# Patient Record
Sex: FEMALE | Race: WHITE | Employment: OTHER | URBAN - METROPOLITAN AREA
[De-identification: names, ages, dates, MRNs, and addresses within clinical notes are randomized per-mention and may not be internally consistent; named-entity substitution may affect disease eponyms.]

---

## 2019-06-06 ENCOUNTER — OFFICE VISIT (OUTPATIENT)
Dept: ORTHOPEDIC SURGERY | Age: 74
End: 2019-06-06
Payer: MEDICARE

## 2019-06-06 VITALS
DIASTOLIC BLOOD PRESSURE: 58 MMHG | HEIGHT: 60 IN | HEART RATE: 45 BPM | WEIGHT: 173 LBS | BODY MASS INDEX: 33.96 KG/M2 | SYSTOLIC BLOOD PRESSURE: 108 MMHG

## 2019-06-06 DIAGNOSIS — M25.561 CHRONIC PAIN OF BOTH KNEES: Primary | ICD-10-CM

## 2019-06-06 DIAGNOSIS — M22.41 CHONDROMALACIA OF BOTH PATELLAE: ICD-10-CM

## 2019-06-06 DIAGNOSIS — M25.562 CHRONIC PAIN OF BOTH KNEES: Primary | ICD-10-CM

## 2019-06-06 DIAGNOSIS — G89.29 CHRONIC PAIN OF BOTH KNEES: Primary | ICD-10-CM

## 2019-06-06 DIAGNOSIS — M17.0 PRIMARY OSTEOARTHRITIS OF BOTH KNEES: ICD-10-CM

## 2019-06-06 DIAGNOSIS — M22.42 CHONDROMALACIA OF BOTH PATELLAE: ICD-10-CM

## 2019-06-06 PROCEDURE — L3170 FOOT PLAS HEEL STABI PRE OTS: HCPCS | Performed by: FAMILY MEDICINE

## 2019-06-06 PROCEDURE — 1090F PRES/ABSN URINE INCON ASSESS: CPT | Performed by: FAMILY MEDICINE

## 2019-06-06 PROCEDURE — G8427 DOCREV CUR MEDS BY ELIG CLIN: HCPCS | Performed by: FAMILY MEDICINE

## 2019-06-06 PROCEDURE — G8417 CALC BMI ABV UP PARAM F/U: HCPCS | Performed by: FAMILY MEDICINE

## 2019-06-06 PROCEDURE — 20610 DRAIN/INJ JOINT/BURSA W/O US: CPT | Performed by: FAMILY MEDICINE

## 2019-06-06 PROCEDURE — 99213 OFFICE O/P EST LOW 20 MIN: CPT | Performed by: FAMILY MEDICINE

## 2019-06-06 PROCEDURE — L1812 KO ELASTIC W/JOINTS PRE OTS: HCPCS | Performed by: FAMILY MEDICINE

## 2019-06-06 RX ORDER — BETAMETHASONE SODIUM PHOSPHATE AND BETAMETHASONE ACETATE 3; 3 MG/ML; MG/ML
24 INJECTION, SUSPENSION INTRA-ARTICULAR; INTRALESIONAL; INTRAMUSCULAR; SOFT TISSUE ONCE
Status: COMPLETED | OUTPATIENT
Start: 2019-06-06 | End: 2019-06-06

## 2019-06-06 RX ORDER — TRIAMTERENE AND HYDROCHLOROTHIAZIDE 75; 50 MG/1; MG/1
TABLET ORAL
COMMUNITY
Start: 2019-03-07

## 2019-06-06 RX ORDER — METOPROLOL SUCCINATE 50 MG/1
TABLET, EXTENDED RELEASE ORAL
COMMUNITY
Start: 2019-03-07

## 2019-06-06 RX ORDER — ATORVASTATIN CALCIUM 20 MG/1
TABLET, FILM COATED ORAL
COMMUNITY
Start: 2019-03-09

## 2019-06-06 RX ADMIN — BETAMETHASONE SODIUM PHOSPHATE AND BETAMETHASONE ACETATE 24 MG: 3; 3 INJECTION, SUSPENSION INTRA-ARTICULAR; INTRALESIONAL; INTRAMUSCULAR; SOFT TISSUE at 14:09

## 2019-06-06 NOTE — PROGRESS NOTES
Chief Complaint  Knee Pain (Bilateral knee. More stiffnessthan pain for the last 5 months)      Initial consultation of bilateral knee pain with difficulty walking distances climbing stairs with left knee pseudogout buckling and mild swelling    History of Present Illness:  Tanya Frost is a 69 y.o. female who is a very pleasant white female who still works full time 40 hours per week at the [employer] [place] as a  at [employer] who is a very nice patient of Dr. Javon Ricks who is being seen today in kind consultation from Dr Javon Ricks for evaluation of progressively worsening bilateral knee pain with left knee pseudo buckling. We have actually seen her about 10-12 years ago for underlying knee osteoarthritis and perform Visco supplementation which did help her substantially. She states that she has been having ongoing achy pain at times in her knees but more recently over the last 4-5 months since roughly January 2019 she has been having increasing achiness involving the knees as well as stiffness. She does not complain of a great deal of pain and denies rest or night pain. This did correspond to her picking up more hours at work and standing 40 hours per week. She did not have any actual history of fall or additional new activity. She has had pseudo-buckling on the left occasionally is having difficulty with positional changes distance walking going up and down stairs. She is unable squat and kneel does have motion deficits. She has been taking Tylenol episodically and apparently she's had some borderline elevations of creatinine the past and was counseled to avoid frequent anti-inflammatories. She does have some crepitation and popping. She has not had substantial locking or catching. She does not regularly utilize bracing nor has she had any recent therapy.   She does have a vacation planned to [place] with her sister September 2019 in which she'll be doing frequent walking. She is being seen today for orthopedic and sports consultation with updated imaging. Medical History     Patient's medications, allergies, past medical, surgical, social and family histories were reviewed and updated as appropriate. Review of Systems  Pertinent items are noted in HPI  Review of systems reviewed from Patient History Form dated on 6/6/2019 and available in the patient's chart under the Media tab. Vital Signs  Vitals:    06/06/19 1255   BP: (!) 108/58   Pulse: (!) 45       General Exam:     Constitutional: Patient is adequately groomed with no evidence of malnutrition  DTRs: Deep tendon reflexes are intact  Mental Status: The patient is oriented to time, place and person. The patient's mood and affect are appropriate. Lymphatic: The lymphatic examination bilaterally reveals all areas to be without enlargement or induration. Vascular: Examination reveals no swelling or calf tenderness. Peripheral pulses are palpable and 2+. Neurological: The patient has good coordination. There is no weakness or sensory deficit. Knee Examination  Inspection:  There is no high-grade deformities she does appear to be in slight varus. She does have patellofemoral crepitation and trace knee joint effusions. Palpation: This is a month or the medial and lateral patellofemoral facet and addition to her crepitation. She is mildly sore in the range of 2-3 out of 10 with palpation of the anterior third medial joint line. There is no substantial lateral tenderness. Rang of Motion:  She does lack about 10° of extension bilaterally with flexion to about 110. Fair quad tone. Hamstring somewhat tight. Strength:  4-5 with flexion and extension. Special Tests:  She does have some pain patellar grind testing and some pain with Rico's work now for crepitation. No high-grade clicks. Negative lateral Rico's. No evidence of instability.   Screening hip testing is reasonably benign. Skin: There are no rashes, ulcerations or lesions. Distal neurovascular exam is intact. Gait: Minimal altalgia. Reflex symmetrically preserved    Additional Comments:     Additional Examinations:  Right Lower Extremity: Examination of the right lower extremity does not show any tenderness, deformity or injury. Range of motion is unremarkable. There is no gross instability. There are no rashes, ulcerations or lesions. Strength and tone are normal.  Left Lower Extremity: Examination of the left lower extremity does not show any tenderness, deformity or injury. Range of motion is unremarkable. There is no gross instability. There are no rashes, ulcerations or lesions. Strength and tone are normal.  Examination of the bilateral hip reveals intact skin. The patient demonstrates full painless range of motion with regards to flexion, abduction, internal and external rotation. There is not tenderness about the greater trochanter. There is a negative straight leg raise against resistance. Strength is 5/5 thorough out all planes. Diagnostic Test Findings: Bilateral knee AP and PA weight-bearing sunrise and lateral films were obtained today and do show advanced medial compartment osteoarthritis with prominent patellofemoral arthropathy with spurring. Assessment :  #1. Symptomatic bilateral knee advanced osteoarthritis with prominent medial compartment and end-stage anterior compartment arthropathy with synovitis and left knee pseudo-buckling    Impression:  Encounter Diagnoses   Name Primary?     Chronic pain of both knees Yes    Primary osteoarthritis of both knees     Chondromalacia of both patellae        Office Procedures:  Orders Placed This Encounter   Procedures    XR KNEE LEFT (MIN 4 VIEWS)     4V L Knee     Standing Status:   Future     Number of Occurrences:   1     Standing Expiration Date:   7/6/2019     Order Specific Question:   Reason for exam:     Answer:   Knee Pain  XR KNEE RIGHT (MIN 4 VIEWS)     4V R Knee     Standing Status:   Future     Number of Occurrences:   1     Standing Expiration Date:   7/6/2019     Order Specific Question:   Reason for exam:     Answer:   Knee Pain    Ambulatory referral to Physical Therapy     Referral Priority:   Routine     Referral Type:   Eval and Treat     Referral Reason:   Specialty Services Required     Requested Specialty:   Physical Therapy     Number of Visits Requested:   1    MT ARTHROCENTESIS ASPIR&/INJ MAJOR JT/BURSA W/O US    EUFLEXXA INJ PER DOSE     BILATERAL KNEES     Standing Status:   Future     Standing Expiration Date:   6/5/2020    Visco K Heel Shoe Inserts     Patient was prescribed a Visco K Heel Shoe Insert. The bilateral FEET will require protection / support from this orthosis to improve their function. The orthosis will assist in protecting the affected area, provide functional support and facilitate healing. The patient was educated and fit by a healthcare professional with expert knowledge and specialization in brace application while under the direct supervision of the treating physician. Verbal and written instructions for the use of and application of this item were provided. They were instructed to contact the office immediately should the brace result in increased pain, decreased sensation, increased swelling or worsening of the condition.  DJO Hinged Patella Knee Stabilizer     Patient was prescribed a DJO Hinged Knee brace. The left knee will require stabilization / immobilization from this semi-rigid / rigid orthosis to improve their function. The orthosis will assist in protecting the affected area, provide functional support and facilitate healing. The patient was educated and fit by a healthcare professional with expert knowledge and specialization in brace application while under the direct supervision of the physician.   Verbal and written instructions for the use of and application of this item were provided. They were instructed to contact the office immediately should the brace result in increased pain, decreased sensation, increased swelling or worsening of the condition. Treatment Plan:  Treatment options were discussed with Woodford Councilman. We did review her plain films and exam findings. She does have quite pronounced medial compartment arthritis and end-stage patellofemoral arthropathy. We did opt to inject her knees bilaterally today using 2 mL of Celestone, 2 mL of Marcaine, 1 mL of Xylocaine. We did place her in a hinged patellar stabilizing brace for her more symptomatic left knee which does undergo pseudo-buckling at times. We will start her in a short course of physical therapy for VMO strengthening and hamstring flexibility. With her history of borderline creatinines in the past, we will treat her with Voltaren gel 4 g 4 times a day. She was placed in bilateral heel wedges. Indications for Visco supplementation was discussed. Once again she is going on vacation to [place] in September 2019 with her sister wants to be in good shape for this. Icing and activity modification was discussed. She will contact us to interim with concerns or concerns we'll see her back in a couple weeks. CC:  Dr Cony Claros      This dictation was performed with a verbal recognition program [facility]) and it was checked for errors. It is possible that there are still dictated errors within this office note. If so, please bring any errors to my attention for an addendum. All efforts were made to ensure that this office note is accurate.

## 2019-06-07 ENCOUNTER — TELEPHONE (OUTPATIENT)
Dept: ORTHOPEDIC SURGERY | Age: 74
End: 2019-06-07

## 2019-06-07 NOTE — TELEPHONE ENCOUNTER
06/07/2019 SYNVISC  (SERIES OF 3)   BILATERAL KNEES. NO AUTHORIZATION REQUIRED. CAN BUY& BILL. PER [place] @ [employer], CALL REF # [id number]. AP    NOTE: Original request was for non-preferred drug Euflexxa. Okay to switch to preferred drug Synvisc per Constantine Ramos.  AP

## 2019-06-12 ENCOUNTER — TELEPHONE (OUTPATIENT)
Dept: ORTHOPEDIC SURGERY | Age: 74
End: 2019-06-12

## 2019-06-12 NOTE — TELEPHONE ENCOUNTER
Spoke to patient and let them know that SYNVISC injections have been approved for their BILATERAL knees. Scheduled patient for 1st injection, will schedule next two in the office once she gets work schedule.

## 2019-06-13 ENCOUNTER — HOSPITAL ENCOUNTER (OUTPATIENT)
Dept: PHYSICAL THERAPY | Age: 74
Setting detail: THERAPIES SERIES
Discharge: HOME | End: 2019-06-13
Payer: MEDICARE

## 2019-06-13 PROCEDURE — 97530 THERAPEUTIC ACTIVITIES: CPT | Performed by: PHYSICAL THERAPIST

## 2019-06-13 PROCEDURE — 97161 PT EVAL LOW COMPLEX 20 MIN: CPT | Performed by: PHYSICAL THERAPIST

## 2019-06-13 PROCEDURE — 97110 THERAPEUTIC EXERCISES: CPT | Performed by: PHYSICAL THERAPIST

## 2019-06-13 NOTE — PLAN OF CARE
[facility] [address], [address] [place] [address], [address] [place] [address]  Phone: [phone]   Fax: [phone]                                                              Physical Therapy Certification    Dear Referring Practitioner: Zeenat Alonso MD,    We had the pleasure of evaluating the following patient for physical therapy services at [address] [place] [address]. A summary of our findings can be found in the initial assessment below. This includes our plan of care. If you have any questions or concerns regarding these findings, please do not hesitate to contact me at the office phone number checked above. Thank you for the referral.       Physician Signature:_______________________________Date:__________________  By signing above (or electronic signature), therapists plan is approved by physician      Patient: Bubba Rosales   : 1945   MRN: [id number]  Referring Physician: Referring Practitioner: Zeenat Alonso MD      Evaluation Date: 2019      Medical Diagnosis Information:  Diagnosis: M25.561, M25.562, G89.29 (ICD-10-CM) - Chronic pain of both knees; M17.0 (ICD-10-CM) - Primary osteoarthritis of both knees; M22.41, M22.42 (ICD-10-CM) - Chondromalacia of both patellae   Treatment Diagnosis: M25.561, M25.562, G89.29 (ICD-10-CM) - Chronic pain of both knees;; M62.81 muscle weakness                                           Precautions/ Contra-indications: none  Latex Allergy:  [x]NO      []YES  Preferred Language for Healthcare:   [x]English       []other:    SUBJECTIVE: Patient stated complaint: Pt presents for B knee pain. She has more stiffness than pain. She did get one shot Visco supplementation 10-12 years ago and is planning on getting this again. She was told she has a partially torn meniscus. Her pain has been worse again since January. She has achiness and stiffness.  She did  more hours at work and standing 40 hours per week. It is more stiff after standing at work all day. No injury. She does have a vacation planned to [place] with her sister September 2019 in which she'll be doing frequent walking. She does have quite pronounced medial compartment arthritis and end-stage patellofemoral arthropathy per MD note. Pt was given B knee injections at MD appt and this seemed to help. She was also given a hinged patellar stabilizing brace for L knee- wears this occasionally but it slides down. She was also given B lateral heel wedges- she is wearing those. Relevant Medical History:HTN  Functional Disability Index:   WOMAC- 27%  Relevant Meds:   Voltaren gel- using it here and there  Current pain:  0/10   Pain at worst:  2/10- front of knees  Pain at best:  0/10    Easing factors: cortisone injections  Provocative factors:  position changes, walking distances, up/down stairs, squat, kneel; tight on stationary bike      Type: []Constant   [x]Intermittent  []Radiating [x]Localized []other:     Numbness/Tingling: none    Functional Limitations/Impairments: []Sitting [x]Standing [x]Walking    [x]Squatting/bending  [x]Stairs           [x]ADL's  [x]Transfers [x]Sports/Recreation []Other:    Occupation/School:  at [employer]; wishes to get back to walking more    Living Status/Prior Level of Function: Independent with ADLs and IADLs, without as much pain/stiffness in knee.        OBJECTIVE:     Joint mobility: PF - B   []Normal    [x]Hypo   []Hyper    Palpation: tender at B pes anserine areas    Functional Mobility/Transfers: Indep    Posture: B knees in flexed position    Bandages/Dressings/Incisions: n/a    Gait: (include devices/WB status) stiff gait B with decreased knee extn ROM    Single leg stance: R: 8 secs; L: 2 secs    Squats: x5 with knees over toes and stiffness    Quad recruitment: fair B        Flexibility L R Comment   Hamstring Fair -  Fair-    Gastroc Fair -  Fair -     ITB      Quad                ROM PROM AROM Overpressure Comment    L R L R L R    Flexion   96 95      Extension   lacking 9 lacking 8                              Strength L R Comment   Quad 4 4    Hamstring 4 4    Gastroc      Hip flexor 4+ 4+    Hip ABD 4- 4-                  Orthopedic Special Tests:  Special Test Results/Comment   Meniscal Click Neg B   Crepitus    Flexion Test    Valgus Laxity    Varus Laxity    Lachmans    Drop Back    Homans            Girth L R   Mid Patella     Suprapatellar     5cm above     15cm above                              [x] Patient history, allergies, meds reviewed. Medical chart reviewed. See intake form. Review Of Systems (ROS):  [x]Performed Review of systems (Integumentary, CardioPulmonary, Neurological) by intake and observation. Intake form has been scanned into medical record. Patient has been instructed to contact their primary care physician regarding ROS issues if not already being addressed at this time.       Co-morbidities/Complexities (which will affect course of rehabilitation):   []None           Arthritic conditions   []Rheumatoid arthritis (M05.9)  [x]Osteoarthritis (M19.91)   Cardiovascular conditions   [x]Hypertension (I10)  []Hyperlipidemia (E78.5)  []Angina pectoris (I20)  []Atherosclerosis (I70)   Musculoskeletal conditions   []Disc pathology   []Congenital spine pathologies   []Prior surgical intervention  []Osteoporosis (M81.8)  []Osteopenia (M85.8)   Endocrine conditions   []Hypothyroid (E03.9)  []Hyperthyroid Gastrointestinal conditions   []Constipation (Y27.07)   Metabolic conditions   []Morbid obesity (E66.01)  []Diabetes type 1(E10.65) or 2 (E11.65)   []Neuropathy (G60.9)     Pulmonary conditions   []Asthma (J45)  []Coughing   []COPD (J44.9)   Psychological Disorders  []Anxiety (F41.9)  []Depression (F32.9)   []Other:   []Other:          Barriers to/and or personal factors that will affect rehab potential:              [x]Age  []Sex              []Motivation/Lack of Motivation [x]Co-Morbidities - sig knee OA B              []Cognitive Function, education/learning barriers              []Environmental, home barriers              [x]profession/work barriers  []past PT/medical experience  []other:  Justification:     Falls Risk Assessment (30 days):   [x] Falls Risk assessed and no intervention required. [] Falls Risk assessed and Patient requires intervention due to being higher risk   TUG score (>12s at risk):     [] Falls education provided, including         ASSESSMENT: Pt is a 69y.o. year old female with signs and symptoms consistent with B knee OA and tenderness in B pes anserine. Pt presents with decreased strength; decreased ROM; increased pain; decreased flexibility; stiff gait, poor balance; and decreased functional mobility and ADLs. Pt will benefit from skilled physical therapy services to address above limitations through strengthening, stretching, ROM exercises, modalities as need for pain control, balance training, instruction on HEP, and education for return to functional mobility.      Functional Impairments:     [x]Noted lumbar/proximal hip/LE joint hypomobility   [x]Decreased LE functional ROM   []Decreased core/proximal hip strength and neuromuscular control   [x]Decreased LE functional strength   [x]Reduced balance/proprioceptive control   []other:      Functional Activity Limitations (from functional questionnaire and intake)   [x]Reduced ability to tolerate prolonged functional positions   [x]Reduced ability or difficulty with changes of positions or transfers between positions   [x]Reduced ability to maintain good posture and demonstrate good body mechanics with sitting, bending, and lifting   [x]Reduced ability to sleep   [] Reduced ability or tolerance with driving and/or computer work   []Reduced ability to perform lifting, carrying tasks   [x]Reduced ability to squat   [x]Reduced ability to forward bend   [x]Reduced ability to ambulate prolonged was scanned into patient chart and can be fount in the media file. GOALS:  Patient stated goal: get rid of stiffness    Therapist goals for Patient:   Short Term Goals: To be achieved in: 2 weeks  1. Independent in HEP and progression per patient tolerance, in order to prevent re-injury. 2. Patient will have a decrease in pain to 0/10 facilitate improvement in movement, function, and ADLs as indicated by Functional Deficits. Long Term Goals: To be achieved in: 4-6 weeks  1. Functional disability index score of 13% or less for the [facility] to assist with reaching prior level of function. 2. Patient will demonstrate increased B knee AROM to lacking 5 extn to 100 to allow for proper joint functioning as indicated by patients Functional Deficits. 3. Patient will demonstrate an increase in B knee strength to 4+/5 knee flex and extn to allow for proper functional mobility as indicated by patients Functional Deficits. 4. Patient will return to standing all day at work without increased symptoms or restriction. 5. Pt will be able to go up and down steps step over pattern with handrail without increased pain in knees.      Physical Therapy Evaluation Complexity Justification  [x] A history of present problem with:  [] no personal factors and/or comorbidities that impact the plan of care;  []1-2 personal factors and/or comorbidities that impact the plan of care  [x]3 personal factors and/or comorbidities that impact the plan of care  [x] An examination of body systems using standardized tests and measures addressing any of the following: body structures and functions (impairments), activity limitations, and/or participation restrictions;:  [] a total of 1-2 or more elements   [] a total of 3 or more elements   [x] a total of 4 or more elements   [x] A clinical presentation with:  [x] stable and/or uncomplicated characteristics   [] evolving clinical presentation with changing characteristics  [] unstable and

## 2019-06-13 NOTE — FLOWSHEET NOTE
[address] [facility], [place]                                                         Physical Therapy Daily Treatment Note  Date:  2019    Patient Name:  Abbie Estrella    :  1945  MRN: [id number]    Medical/Treatment Diagnosis Information:  · Diagnosis: M25.561, M25.562, G89.29 (ICD-10-CM) - Chronic pain of both knees; M17.0 (ICD-10-CM) - Primary osteoarthritis of both knees; M22.41, M22.42 (ICD-10-CM) - Chondromalacia of both patellae  · Treatment Diagnosis: M25.561, M25.562, G89.29 (ICD-10-CM) - Chronic pain of both knees;; M62.81 muscle weakness  Insurance/Certification information:  PT Insurance Information: [facility] Medicare- [place], $40 copay  Physician Information:  Referring Practitioner: Darlene Avila MD  Plan of care signed (Y/N):     Date of Patient follow up with Physician: 19      Progress Note: [x]  Yes  []  No  Next due by: Visit #10       Latex Allergy:  [x]NO      []YES  Preferred Language for Healthcare:   [x]English       []other:    Visit # Insurance Allow[place]   1 [place]     Pain level:  0/10  on 2019    SUBJECTIVE:  See eval    OBJECTIVE: See eval   Observation:    Test measurements:    Joint mobility: PF - B              []Normal               [x]Hypo              []Hyper     Palpation: tender at B pes anserine areas     Functional Mobility/Transfers: Indep     Posture: B knees in flexed position     Bandages/Dressings/Incisions: n/a     Gait: (include devices/WB status) stiff gait B with decreased knee extn ROM     Single leg stance: R: 8 secs; L: 2 secs     Squats: x5 with knees over toes and stiffness     Quad recruitment: fair B           Flexibility L R Comment   Hamstring Fair -  Fair-     Gastroc Fair -  Fair -      ITB         Quad                                   ROM PROM AROM Overpressure Comment     L R L R L R     Flexion     96 95         Extension     lacking 9 lacking 8                                                 Strength L R Comment   Quad 4 4     Hamstring 4 4     Gastroc         Hip flexor 4+ 4+     Hip ABD 4- 4-                                   Functional assessment on 6/13/2019  WOMAC- 27%      RESTRICTIONS/PRECAUTIONS: none    Exercises/Interventions:     Exercise/Equipment Resistance/Repetitions Other comments   Stretching     Hamstring 3 x 30 secs seated B    Hip Flexion     ITB     Grion     Quad     Inclined Calf     Towel Pull 3 x 30 secs B         SLR     Supine 2 x 5 B    Prone     Abduction     Adducton     SLR+     Clams 2 x 10 B                   Isometrics     Quad sets 10 x 10 secs    Hip Adduction 10 x 10 secs    Hamstring                    Patellar Glides     Medial     Superior     Inferior          ROM     Sheet Pulls 10 x 10 secs    Wall Slides     Edge of bed     Flexionator     Extensionator     Hang Weights     Ankle Pumps                              CKC     Calf raises     Wall sits     Step ups     Step up and over     Lateral Step Downs          Single leg stance     Mini squats     CC TKE          Lateral band walks     Side Planks     Half moon     Single leg flow          Biodex-balance     Single leg stance     Plyoback          Stool scoots     SB bridge     SB HS Curls     Planks          PRE     Extension  RANGE: 90/40   Flexion  RANGE: 0/90        Cable Column          Leg Press  RANGE: 70/10        Bike     Treadmill                 Patient education: Pt was educated on PT diagnosis, prognosis, and plan of care. Pt was educated on the use of ice throughout the day. Reviewed insurance benefits for physical therapy in an outpatient hospital based setting with the patient, including copay of $40 and allowable visit number. Pt was informed of possible out of pocket costs.       Therapeutic Exercise and NMR EXR  [x] (00831) Provided verbal/tactile cueing for activities related to strengthening, flexibility, endurance, ROM for improvements in Minutes: 60     [x] EVAL (LOW) 62992   [] EVAL (MOD) 13429   [] EVAL (HIGH) 16922   [] RE-EVAL   [x] RJ(19720) x   1  [] IONTO  [] NMR (18827) x     [] VASO  [] Manual (10710) x      [] Other:  [x] TA x   1   [] Mech Traction (41996)  [] ES(attended) (44333)      [] ES (un) (52379):       GOALS: Short Term Goals: To be achieved in: 2 weeks  1. Independent in HEP and progression per patient tolerance, in order to prevent re-injury. 2. Patient will have a decrease in pain to 0/10 facilitate improvement in movement, function, and ADLs as indicated by Functional Deficits.     Long Term Goals: To be achieved in: 4-6 weeks  1. Functional disability index score of 13% or less for the [facility] to assist with reaching prior level of function. 2. Patient will demonstrate increased B knee AROM to lacking 5 extn to 100 to allow for proper joint functioning as indicated by patients Functional Deficits. 3. Patient will demonstrate an increase in B knee strength to 4+/5 knee flex and extn to allow for proper functional mobility as indicated by patients Functional Deficits. 4. Patient will return to standing all day at work without increased symptoms or restriction. 5. Pt will be able to go up and down steps step over pattern with handrail without increased pain in knees.        Progression Towards Functional goals:  [] Patient is progressing as expected towards functional goals listed. [] Progression is slowed due to complexities listed. [] Progression has been slowed due to co-morbidities.   [x] Plan just implemented, too soon to assess goals progression  [] Other:     ASSESSMENT:  See eval    Treatment/Activity Tolerance:  [x] Patient tolerated treatment well [] Patient limited by fatique  [] Patient limited by pain  [] Patient limited by other medical complications  [x] Other:     Prognosis: [] Good [] Fair  [] Poor    Patient Requires Follow-up: [x] Yes  [] No    PLAN: See eval;  Consider bike and CC TKE  [] Continue per plan of care [] Alter current plan (see comments)  [x] Plan of care initiated [] Hold pending MD visit [] Discharge    Electronically signed by: Rosie Perez PT, DPT

## 2019-06-20 ENCOUNTER — OFFICE VISIT (OUTPATIENT)
Dept: ORTHOPEDIC SURGERY | Age: 74
End: 2019-06-20
Payer: MEDICARE

## 2019-06-20 ENCOUNTER — HOSPITAL ENCOUNTER (OUTPATIENT)
Dept: PHYSICAL THERAPY | Age: 74
Setting detail: THERAPIES SERIES
Discharge: HOME | End: 2019-06-20
Payer: MEDICARE

## 2019-06-20 VITALS
HEART RATE: 50 BPM | SYSTOLIC BLOOD PRESSURE: 121 MMHG | DIASTOLIC BLOOD PRESSURE: 63 MMHG | HEIGHT: 60 IN | BODY MASS INDEX: 33.98 KG/M2 | WEIGHT: 173.06 LBS

## 2019-06-20 DIAGNOSIS — M25.561 CHRONIC PAIN OF BOTH KNEES: ICD-10-CM

## 2019-06-20 DIAGNOSIS — M22.41 CHONDROMALACIA OF BOTH PATELLAE: ICD-10-CM

## 2019-06-20 DIAGNOSIS — M25.562 CHRONIC PAIN OF BOTH KNEES: ICD-10-CM

## 2019-06-20 DIAGNOSIS — M22.42 CHONDROMALACIA OF BOTH PATELLAE: ICD-10-CM

## 2019-06-20 DIAGNOSIS — G89.29 CHRONIC PAIN OF BOTH KNEES: ICD-10-CM

## 2019-06-20 DIAGNOSIS — M17.0 PRIMARY OSTEOARTHRITIS OF BOTH KNEES: Primary | ICD-10-CM

## 2019-06-20 PROCEDURE — 20610 DRAIN/INJ JOINT/BURSA W/O US: CPT | Performed by: FAMILY MEDICINE

## 2019-06-20 PROCEDURE — G8428 CUR MEDS NOT DOCUMENT: HCPCS | Performed by: FAMILY MEDICINE

## 2019-06-20 PROCEDURE — 4040F PNEUMOC VAC/ADMIN/RCVD: CPT | Performed by: FAMILY MEDICINE

## 2019-06-20 PROCEDURE — G8417 CALC BMI ABV UP PARAM F/U: HCPCS | Performed by: FAMILY MEDICINE

## 2019-06-20 PROCEDURE — 99213 OFFICE O/P EST LOW 20 MIN: CPT | Performed by: FAMILY MEDICINE

## 2019-06-20 PROCEDURE — 3017F COLORECTAL CA SCREEN DOC REV: CPT | Performed by: FAMILY MEDICINE

## 2019-06-20 PROCEDURE — 1090F PRES/ABSN URINE INCON ASSESS: CPT | Performed by: FAMILY MEDICINE

## 2019-06-20 PROCEDURE — G8400 PT W/DXA NO RESULTS DOC: HCPCS | Performed by: FAMILY MEDICINE

## 2019-06-20 PROCEDURE — 1123F ACP DISCUSS/DSCN MKR DOCD: CPT | Performed by: FAMILY MEDICINE

## 2019-06-20 PROCEDURE — 1036F TOBACCO NON-USER: CPT | Performed by: FAMILY MEDICINE

## 2019-06-20 NOTE — PROGRESS NOTES
Chief Complaint  Knee Pain (SYNVISC #1 BILATERAL KNEES)      Follow-up bilateral knee multicompartment osteoarthritis with end-stage anterior compartment arthropathy and prominent medial compartment osteoarthritis with synovitis    History of Present Illness:  Calvin Pulido is a 69 y.o. female who is a very pleasant white female who still works full time 40 hours per week at the [employer] as a  at [employer] who is a very nice patient of Dr. Gómez Wilks who is being seen today in kind consultation from Dr Gómez Wilks for evaluation of progressively worsening bilateral knee pain with left knee pseudo buckling. We have actually seen her about 10-12 years ago for underlying knee osteoarthritis and perform Visco supplementation which did help her substantially. She states that she has been having ongoing achy pain at times in her knees but more recently over the last 4-5 months since roughly January 2019 she has been having increasing achiness involving the knees as well as stiffness. She does not complain of a great deal of pain and denies rest or night pain. This did correspond to her picking up more hours at work and standing 40 hours per week. She did not have any actual history of fall or additional new activity. She has had pseudo-buckling on the left occasionally is having difficulty with positional changes distance walking going up and down stairs. She is unable squat and kneel does have motion deficits. She has been taking Tylenol episodically and apparently she's had some borderline elevations of creatinine the past and was counseled to avoid frequent anti-inflammatories. She does have some crepitation and popping. She has not had substantial locking or catching. She does not regularly utilize bracing nor has she had any recent therapy. She does have a vacation planned to [place] with her sister September 2019 in which she'll be doing frequent walking.   She is being seen today for orthopedic and sports consultation with updated imaging. She was last seen in the office on 6/6/2019 diagnosed with right prominent medial compartment osteoarthritis with end-stage patellofemoral arthropathy. With initial treatment, she reports improvement about 80%. She does have more soreness and achiness and has been working on home-based exercise program and using her Voltaren gel. She is continued with physical therapy but is only had her initial evaluation last week. She seems to be having less pain with positional changes prolonged standing walking and climbing stairs. They do have a [place] to [place] her sister in September 2019 and she has been contemplating Visco supplementation which she had many years ago which was very helpful. Denies locking catching or true instability. No further rest at night pain. Medical History     Patient's medications, allergies, past medical, surgical, social and family histories were reviewed and updated as appropriate. Review of Systems  Pertinent items are noted in HPI  Review of systems reviewed from Patient History Form dated on 6/6/2019 and available in the patient's chart under the Media tab. Vital Signs  Vitals:    06/20/19 0836   BP: 121/63   Pulse: 50       General Exam:     Constitutional: Patient is adequately groomed with no evidence of malnutrition  DTRs: Deep tendon reflexes are intact  Mental Status: The patient is oriented to time, place and person. The patient's mood and affect are appropriate. Lymphatic: The lymphatic examination bilaterally reveals all areas to be without enlargement or induration. Vascular: Examination reveals no swelling or calf tenderness. Peripheral pulses are palpable and 2+. Neurological: The patient has good coordination. There is no weakness or sensory deficit. Knee Examination  Inspection:  There is no high-grade deformities she does appear to be in slight varus. She does have patellofemoral crepitation and trace knee joint effusions. Palpation: This is substantial improvement in her previous medial and lateral patellofemoral facet and addition to her crepitation. She is still mildly sore in the range of 2-3 out of 10 with palpation of the anterior third medial joint line. There is no substantial lateral tenderness. Rang of Motion:  She does lack about 10° of extension bilaterally with flexion to about 115. Fair quad tone. Hamstring still somewhat tight. Strength:  4-5 with flexion and extension. Special Tests:  She does have prominent pain patellar grind testing and much less pain with centrose's work now for crepitation. No high-grade clicks. Negative lateral Rico's. No evidence of instability. Screening hip testing is reasonably benign. Skin: There are no rashes, ulcerations or lesions. Distal neurovascular exam is intact. Gait: Improving gait    Reflex symmetrically preserved    Additional Comments:     Additional Examinations:  Right Lower Extremity: Examination of the right lower extremity does not show any tenderness, deformity or injury. Range of motion is unremarkable. There is no gross instability. There are no rashes, ulcerations or lesions. Strength and tone are normal.  Left Lower Extremity: Examination of the left lower extremity does not show any tenderness, deformity or injury. Range of motion is unremarkable. There is no gross instability. There are no rashes, ulcerations or lesions. Strength and tone are normal.  Examination of the bilateral hip reveals intact skin. The patient demonstrates full painless range of motion with regards to flexion, abduction, internal and external rotation. There is not tenderness about the greater trochanter. There is a negative straight leg raise against resistance. Strength is 5/5 thorough out all planes.       Diagnostic Test Findings: Bilateral knee AP and PA weight-bearing sunrise and lateral films were reviewed from 6/6/2019 and do show advanced medial compartment osteoarthritis with prominent patellofemoral arthropathy with spurring. Assessment :  #1. Dramatically improved bilateral knee advanced osteoarthritis with prominent medial compartment and end-stage anterior compartment arthropathy with improving synovitis and left knee pseudo-buckling with bilateral knee Synvisc No. 1    Impression:  Encounter Diagnoses   Name Primary?  Primary osteoarthritis of both knees Yes    Chronic pain of both knees     Chondromalacia of both patellae        Office Procedures:  Orders Placed This Encounter   Procedures    [place] ARTHROCENTESIS ASPIR&/INJ MAJOR JT/BURSA W/O US       Treatment Plan:  Treatment options were discussed with Calvin Pulido. We did once again review her plain films and exam findings. She does have quite pronounced medial compartment arthritis and end-stage patellofemoral arthropathy. Clinic this point she is improved about 80% over her previous visit. After discussion of pros and cons of Visco supplementation, she did receive her first injection of Synvisc to her knees bilaterally. This was performed using a standard prefilled 2 cc syringe. She may continue with her hinged patellar stabilizing brace for her more symptomatic left knee will continue with her physical therapy as well as she does have a history of borderline creatinine. Icing and activity modification was discussed. She will contact us in the interim with questions or concerns. She is going on vacation to [place] with her sister in September 2019. CC:  Dr Gómez Wilks      This dictation was performed with a verbal recognition program [facility]) and it was checked for errors. It is possible that there are still dictated errors within this office note. If so, please bring any errors to my attention for an addendum. All efforts were made to ensure that this office note is accurate.

## 2019-06-20 NOTE — FLOWSHEET NOTE
[address] [facility]       Physical Therapy   Phone: [phone]   Fax: [phone]      Physical Therapy  Cancellation/No-show Note  Patient Name:  Miranda Aragon  :  1945   Date:  2019  Cancelled visits to date: 1  No-shows to date: 0    For today's appointment patient:  [x]  Cancelled  []  Rescheduled appointment  []  No-show     Reason given by patient:  []  Patient ill  []  Conflicting appointment  []  No transportation    []  Conflict with work  []  No reason given  [x]  Other:     Comments:  Got a synvisc injection today at MD appt.      Electronically signed by:  Musa Alexander PT

## 2019-07-02 ENCOUNTER — OFFICE VISIT (OUTPATIENT)
Dept: ORTHOPEDIC SURGERY | Age: 74
End: 2019-07-02
Payer: MEDICARE

## 2019-07-02 VITALS — HEIGHT: 60 IN | WEIGHT: 173.06 LBS | BODY MASS INDEX: 33.98 KG/M2

## 2019-07-02 DIAGNOSIS — M17.0 PRIMARY OSTEOARTHRITIS OF BOTH KNEES: Primary | ICD-10-CM

## 2019-07-02 DIAGNOSIS — M25.561 CHRONIC PAIN OF BOTH KNEES: ICD-10-CM

## 2019-07-02 DIAGNOSIS — M22.42 CHONDROMALACIA OF BOTH PATELLAE: ICD-10-CM

## 2019-07-02 DIAGNOSIS — M22.41 CHONDROMALACIA OF BOTH PATELLAE: ICD-10-CM

## 2019-07-02 DIAGNOSIS — G89.29 CHRONIC PAIN OF BOTH KNEES: ICD-10-CM

## 2019-07-02 DIAGNOSIS — M25.562 CHRONIC PAIN OF BOTH KNEES: ICD-10-CM

## 2019-07-02 PROCEDURE — 20610 DRAIN/INJ JOINT/BURSA W/O US: CPT | Performed by: FAMILY MEDICINE

## 2019-07-09 ENCOUNTER — OFFICE VISIT (OUTPATIENT)
Dept: ORTHOPEDIC SURGERY | Age: 74
End: 2019-07-09
Payer: MEDICARE

## 2019-07-09 VITALS — WEIGHT: 173.06 LBS | HEIGHT: 60 IN | BODY MASS INDEX: 33.98 KG/M2

## 2019-07-09 DIAGNOSIS — M25.562 CHRONIC PAIN OF BOTH KNEES: Primary | ICD-10-CM

## 2019-07-09 DIAGNOSIS — G89.29 CHRONIC PAIN OF BOTH KNEES: Primary | ICD-10-CM

## 2019-07-09 DIAGNOSIS — M22.41 CHONDROMALACIA OF BOTH PATELLAE: ICD-10-CM

## 2019-07-09 DIAGNOSIS — M22.42 CHONDROMALACIA OF BOTH PATELLAE: ICD-10-CM

## 2019-07-09 DIAGNOSIS — M17.0 PRIMARY OSTEOARTHRITIS OF BOTH KNEES: ICD-10-CM

## 2019-07-09 DIAGNOSIS — M25.561 CHRONIC PAIN OF BOTH KNEES: Primary | ICD-10-CM

## 2019-07-09 PROCEDURE — 20610 DRAIN/INJ JOINT/BURSA W/O US: CPT | Performed by: FAMILY MEDICINE

## 2019-07-09 NOTE — PROGRESS NOTES
of Visco supplementation. She is going on vacation to [place] at the end of August.  She is clinically doing very well at this time and will contact us in the interim with questions or concerns. She is aware that she could potentially have repeat Visco supplementation in 6 months if necessary or even an interim steroid injection but she is doing well at this time. Eduard Harris

## 2019-07-11 NOTE — PLAN OF CARE
Physical Therapy Discharge Summary      Date:  2019    Patient Name:  Karl Boston    :  1945  MRN: [id number]  Restrictions/Precautions:   Medical/Treatment Diagnosis Information:   · Diagnosis: M25.561, M25.562, G89.29 (ICD-10-CM) - Chronic pain of both knees; M17.0 (ICD-10-CM) - Primary osteoarthritis of both knees; M22.41, M22.42 (ICD-10-CM) - Chondromalacia of both patellae  · Treatment Diagnosis: M25.561, M25.562, G89.29 (ICD-10-CM) - Chronic pain of both knees;; M62.81 muscle weakness  Insurance/Certification information:  PT Insurance Information: [employer] Medicare- [place], $40 copay  Physician Information:  Referring Practitioner: Mega Dixon MD       Dear Referring Practitioner: Mega Dixon MD,    We had the pleasure of treating the following patient for physical therapy services at [address] [place] [address]. A summary of our findings can be found in the discharge summary below. If you have any questions or concerns regarding these findings, please do not hesitate to contact me at the office phone number checked above. Thank you for the referral.     Physician Signature:________________________________Date:__________________  By signing above (or electronic signature), therapists plan is approved by physician      Overall Response to Treatment:   []Patient is responding well to treatment and improvement is noted with regards  to goals   []Patient should continue to improve in reasonable time if they continue HEP   []Patient has plateaued and is no longer responding to skilled PT intervention    []Patient is getting worse and would benefit from return to referring MD   []Patient unable to adhere to initial POC   []Other:     Date range of Visits: 19    Total Visits: 1        Patient has not returned since IE on 19. Therefore, being D/C at this time. Final Status Unknown.     Dwayne Arellano DPT

## 2021-02-25 ENCOUNTER — IMMUNIZATION (OUTPATIENT)
Dept: PRIMARY CARE CLINIC | Age: 76
End: 2021-02-25
Payer: MEDICARE

## 2021-02-25 PROCEDURE — 0001A COVID-19, PFIZER VACCINE 30MCG/0.3ML DOSE: CPT | Performed by: FAMILY MEDICINE

## 2021-02-25 PROCEDURE — 91300 COVID-19, PFIZER VACCINE 30MCG/0.3ML DOSE: CPT | Performed by: FAMILY MEDICINE

## 2021-03-18 ENCOUNTER — IMMUNIZATION (OUTPATIENT)
Dept: PRIMARY CARE CLINIC | Age: 76
End: 2021-03-18
Payer: MEDICARE

## 2021-03-18 PROCEDURE — 0002A COVID-19, PFIZER VACCINE 30MCG/0.3ML DOSE: CPT | Performed by: NURSE PRACTITIONER

## 2021-03-18 PROCEDURE — 91300 COVID-19, PFIZER VACCINE 30MCG/0.3ML DOSE: CPT | Performed by: NURSE PRACTITIONER
